# Patient Record
Sex: FEMALE | Race: WHITE | NOT HISPANIC OR LATINO
[De-identification: names, ages, dates, MRNs, and addresses within clinical notes are randomized per-mention and may not be internally consistent; named-entity substitution may affect disease eponyms.]

---
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
## 2020-01-02 ENCOUNTER — APPOINTMENT (OUTPATIENT)
Dept: PEDIATRIC RHEUMATOLOGY | Facility: CLINIC | Age: 20
End: 2020-01-02
Payer: COMMERCIAL

## 2020-01-02 VITALS
SYSTOLIC BLOOD PRESSURE: 120 MMHG | DIASTOLIC BLOOD PRESSURE: 71 MMHG | BODY MASS INDEX: 23.62 KG/M2 | WEIGHT: 164.99 LBS | HEIGHT: 70 IN | HEART RATE: 78 BPM

## 2020-01-02 PROCEDURE — 99215 OFFICE O/P EST HI 40 MIN: CPT

## 2020-01-02 NOTE — CONSULT LETTER
[Courtesy Letter:] : I had the pleasure of seeing your patient, [unfilled], in my office today. [Dear  ___] : Dear  [unfilled], [Please see my note below.] : Please see my note below. [Sincerely,] : Sincerely, [Rachel Fiore MD] : Rachel Fiore MD [[facility]] : [facility]  [FreeTextEntry2] : Ephraim Knight MD\par [address] [place] [address] \par [place] [id number]

## 2020-01-02 NOTE — SOCIAL HISTORY
[Father] : father [Mother] : mother [___ Sisters] : [unfilled] sisters [College] : College [Sexually Active] : patient is sexually active [de-identified] : in [place] (~ 2 hour drive[place] [FreeTextEntry1] : sophomore at [employer], studying hospitality management

## 2020-01-02 NOTE — REVIEW OF SYSTEMS
[NI] : Endocrine [Immunizations are up to date] : Immunizations are up to date [Nl] : Musculoskeletal [Smokers in Home] : no one in home smokes [FreeTextEntry1] : Records maintained by PMD\par received flu vaccine 2018/2019

## 2020-01-02 NOTE — DISCUSSION/SUMMARY
[FreeTextEntry1] : DIAGNOSIS\par \par 1) POLYARTICULAR MAME, RF NEGATIVE\par Tapered Enbrel 25mg from q3w to q4w --> symptomatically worse (aches and stiffness, finger discomfort and warmth in the morning, AM stiffness) \par Enbrel increased to 50mg q3w July 2019 --> symptomatically improved\par On exam, some LOM and stiffness of hands and wrists, mild swelling of multiple MCP's \par Never did x-rays\par Discussed exam abnormalities likely related to past arthritis -- asymptomatic now, inflammatory markers normal \par \par PLAN\par 1. blood tests today to monitor medication toxicity and disease activity \par 2. taper Enbrel to 50mg SQ q4w\par 3. ophtho f/u\par 4. RTC when home in April

## 2020-01-02 NOTE — PHYSICAL EXAM
[Cardiac Auscultation] : normal cardiac auscultation  [Normal] : normal [Auscultation] : lungs clear to auscultation [Refer to Joint Diagram Below] : refer to joint diagram below [Grossly Intact] : grossly intact [_______] : 2nd MCP: [unfilled]  [FreeTextEntry1] : well-appearing [de-identified] : + hands stiff, at rest slightly flexed at MCP's and PIP's, + R MCP's appear slightly swollen compared to L

## 2020-01-02 NOTE — HISTORY OF PRESENT ILLNESS
[___ Month(s) Ago] : [unfilled] month(s) ago [FreeTextEntry1] : Has been on Enbrel q3w, on higher dose since ~ September, now on pen. [facility] dose [facility]/11 at school [facility]. Due yesterday but didn't want to take because no pain anymore, not like before (realized a few months later). No swelling. Not really stiff.\par \par Had hymenectomy.\par \par Semester went well, played tennis. Got flu vaccine at school. \par \par No fevers or illnesses.\par \par Need to schedule ophtho f/u. \par \par Working at [employer] Getting wisdom teeth removed 1/6. Going back to school ~ [employer]. Going to [employer] (study abroad) 2/29 x 9 days. Will be home again in April.

## 2020-01-03 LAB
ALBUMIN SERPL ELPH-MCNC: 4.3 G/DL
ALP BLD-CCNC: 43 U/L
ALT SERPL-CCNC: 11 U/L
ANION GAP SERPL CALC-SCNC: 10 MMOL/L
AST SERPL-CCNC: 16 U/L
BASOPHILS # BLD AUTO: 0.03 K/UL
BASOPHILS NFR BLD AUTO: 0.6 %
BILIRUB SERPL-MCNC: 0.2 MG/DL
BUN SERPL-MCNC: 11 MG/DL
CALCIUM SERPL-MCNC: 8.9 MG/DL
CHLORIDE SERPL-SCNC: 103 MMOL/L
CO2 SERPL-SCNC: 25 MMOL/L
CREAT SERPL-MCNC: 0.84 MG/DL
CRP SERPL-MCNC: 0.56 MG/DL
EOSINOPHIL # BLD AUTO: 0.08 K/UL
EOSINOPHIL NFR BLD AUTO: 1.5 %
ERYTHROCYTE [SEDIMENTATION RATE] IN BLOOD BY WESTERGREN METHOD: 4 MM/HR
GLUCOSE SERPL-MCNC: 99 MG/DL
HCT VFR BLD CALC: 37.2 %
HGB BLD-MCNC: 12.1 G/DL
IMM GRANULOCYTES NFR BLD AUTO: 0.2 %
LYMPHOCYTES # BLD AUTO: 1.68 K/UL
LYMPHOCYTES NFR BLD AUTO: 31.9 %
MAN DIFF?: NORMAL
MCHC RBC-ENTMCNC: 27.9 PG
MCHC RBC-ENTMCNC: 32.5 GM/DL
MCV RBC AUTO: 85.9 FL
MONOCYTES # BLD AUTO: 0.61 K/UL
MONOCYTES NFR BLD AUTO: 11.6 %
NEUTROPHILS # BLD AUTO: 2.86 K/UL
NEUTROPHILS NFR BLD AUTO: 54.2 %
PLATELET # BLD AUTO: 178 K/UL
POTASSIUM SERPL-SCNC: 3.7 MMOL/L
PROT SERPL-MCNC: 6.5 G/DL
RBC # BLD: 4.33 M/UL
RBC # FLD: 13.7 %
SODIUM SERPL-SCNC: 138 MMOL/L
WBC # FLD AUTO: 5.27 K/UL

## 2020-01-04 LAB
M TB IFN-G BLD-IMP: NEGATIVE
QUANTIFERON TB PLUS MITOGEN MINUS NIL: 9.33 IU/ML
QUANTIFERON TB PLUS NIL: 0.02 IU/ML
QUANTIFERON TB PLUS TB1 MINUS NIL: 0 IU/ML
QUANTIFERON TB PLUS TB2 MINUS NIL: 0.01 IU/ML

## 2020-05-14 ENCOUNTER — APPOINTMENT (OUTPATIENT)
Dept: PEDIATRIC RHEUMATOLOGY | Facility: CLINIC | Age: 20
End: 2020-05-14
Payer: COMMERCIAL

## 2020-05-14 PROCEDURE — 99215 OFFICE O/P EST HI 40 MIN: CPT | Mod: 95

## 2020-06-14 NOTE — SOCIAL HISTORY
[Mother] : mother [Father] : father [___ Sisters] : [unfilled] sisters [College] : College [Sexually Active] : patient is sexually active [de-identified] : in [place] (~ 2 hour drive) [FreeTextEntry1] : sophomore at [employer], studying hospitality management

## 2020-06-14 NOTE — CONSULT LETTER
[Dear  ___] : Dear  [unfilled], [Courtesy Letter:] : I had the pleasure of seeing your patient, [unfilled], in my office today. [Please see my note below.] : Please see my note below. [Sincerely,] : Sincerely, [Rachel Fiore MD] : Rachel Fiore MD [The [facility]] : [facility]  [FreeTextEntry2] : DR. DOUGLAS SHOENBERGER \par [address] [place] [address]\[place] [address]

## 2020-06-14 NOTE — REVIEW OF SYSTEMS
[NI] : Endocrine [Nl] : Hematologic/Lymphatic [Immunizations are up to date] : Immunizations are up to date [AM Stiffness] : am stiffness [Wrist Pain] : wrist pain [Finger Pain] : pain in the finger [Smokers in Home] : no one in home smokes [FreeTextEntry1] : Records maintained by PMD\par received flu vaccine 2019/2020 at school

## 2020-06-14 NOTE — PHYSICAL EXAM
[Normal] : normal [Refer to Joint Diagram Below] : refer to joint diagram below [Grossly Intact] : grossly intact [_______] : Wrist: [unfilled]  [FreeTextEntry1] : well-appearing, limited exam - televideo only

## 2020-06-14 NOTE — HISTORY OF PRESENT ILLNESS
[___ Month(s) Ago] : [unfilled] month(s) ago [Other Location: e.g. School (Enter Location, City,State)___] : at [unfilled], at the time of the visit. [Other Location: e.g. Home (Enter Location, City,State)___] : at [unfilled] [Verbal consent obtained from patient] : the patient, [unfilled] [FreeTextEntry1] : On Enbrel q4w (pen is easier). Took 3/9 (delayed due to coronavirus) and ~4/12 (Easter). Plans to take this weekend. Mother doing injections. A few days before due, stiff x 30 min, pain worse in the morning but throughout day - wrists, fingers. No pain during last week. No joint swelling.   \par \par Had bronchitis, fever during trip (end of Feb - beginning of Mar). \par \par Not exercising.\par \par Finished semester. Taking a hospitality class this summer.

## 2020-06-14 NOTE — DISCUSSION/SUMMARY
[FreeTextEntry1] : DIAGNOSIS\par \par 1) POLYARTICULAR MAME, RF NEGATIVE\par Previously only on Enbrel 25mg q3w, q4w (very low dose) \par ~ September 2019 Enbrel increased to 50mg q3w (pen) --> symptomatically improved\par Sometimes reports symptoms a few days before due for dose (mostly wrists, fingers) \par Also hasn't wanted to take when she doesn't have pain  \par On exam, some LOM and stiffness of hands and wrists, mild swelling of multiple MCP's \par -- suspect mostly related to past arthritis (never did x-rays) \par Exam today limited (telehealth visit due to COVID-19 pandemic) but appears to be stable\par \par PLAN\par 1. ordered blood tests to monitor medication toxicity and disease activity \par 2. continue Enbrel 50mg SQ q4w\par 3. ophtho f/u\par 4. follow-up on 8/6 @ 3pm

## 2020-07-19 ENCOUNTER — TRANSCRIPTION ENCOUNTER (OUTPATIENT)
Age: 20
End: 2020-07-19

## 2020-08-06 ENCOUNTER — APPOINTMENT (OUTPATIENT)
Dept: PEDIATRIC RHEUMATOLOGY | Facility: CLINIC | Age: 20
End: 2020-08-06
Payer: COMMERCIAL

## 2020-08-06 PROCEDURE — 99215 OFFICE O/P EST HI 40 MIN: CPT | Mod: 95

## 2020-08-06 NOTE — DISCUSSION/SUMMARY
[FreeTextEntry1] : DIAGNOSIS\par \par 1) POLYARTICULAR MAME, RF NEGATIVE\par Previously only on Enbrel 25mg q3w, q4w (very low dose) \par ~ September 2019 Enbrel increased to 50mg q3w (pen) --> symptomatically improved\par Sometimes reports symptoms a few days before due for dose (mostly wrists, fingers) \par Also hasn't wanted to take when she doesn't have pain  \par On prior exam, some LOM and stiffness of hands and wrists, mild swelling of multiple MCP's \par -- suspect mostly related to past arthritis (never did x-rays) \par \par Exam today limited (telehealth visit today on [employer] due to COVID-19 pandemic) but appears to be stable\par \par PLAN\par 1. ordered blood tests to monitor medication toxicity and disease activity\par 2. continue Enbrel 50mg SQ q4w\par 3. ophtho f/u\par 4. RTC during winter break

## 2020-08-06 NOTE — PHYSICAL EXAM
[Refer to Joint Diagram Below] : refer to joint diagram below [Grossly Intact] : grossly intact [_______] : Wrist: [unfilled]  [Normal] : normal [FreeTextEntry1] : well-appearing, limited exam - televideo only [de-identified] : + difficult making fists (reportedly unchanged)

## 2020-08-06 NOTE — CONSULT LETTER
[Dear  ___] : Dear  [unfilled], [Please see my note below.] : Please see my note below. [Courtesy Letter:] : I had the pleasure of seeing your patient, [unfilled], in my office today. [Sincerely,] : Sincerely, [Rachel Fiore MD] : Rachel Fiore MD [The [facility]] : [facility]  [FreeTextEntry2] : DR. DOUGLAS SHOENBERGER \par [address] [place] [address]\[place] [address]

## 2020-08-06 NOTE — HISTORY OF PRESENT ILLNESS
[___ Month(s) Ago] : [unfilled] month(s) ago [Other Location: e.g. Home (Enter Location, City,State)___] : at [unfilled] [Verbal consent obtained from patient] : the patient, [unfilled] [Other Location: e.g. School (Enter Location, City,State)___] : at [unfilled], at the time of the visit. [FreeTextEntry1] : On Enbrel q4w, last dose ~2 weeks ago (has another 4 doses at home). Sometimes forgets and is a few days late but gets reminded by pain. Mother does injections. Joints are "pretty good." Only gets hand and wrist pain and stiffness x 20 min around when she's due for dose. No swelling. No other joint complaints.     \par \par Labs 5/21/20 unremarkable. COVID-19 Ab test reportedly negative. \par \par Last saw ophtho last summer - will schedule f/u.\par  \par No fevers or new symptoms. \par \par Hasn't exercised much. Took an online class this summer.  \par \par May go to [employer].  \par Going back to school 8/19. Plan for school health center to administer Enbrel. Tennis season cancelled. Will be home for a long winter break starting around Thanksgiving.

## 2020-08-06 NOTE — REVIEW OF SYSTEMS
[NI] : Endocrine [Nl] : Hematologic/Lymphatic [AM Stiffness] : am stiffness [Finger Pain] : pain in the finger [Wrist Pain] : wrist pain [Immunizations are up to date] : Immunizations are up to date [Smokers in Home] : no one in home smokes [FreeTextEntry1] : Records maintained by PMD\par received flu vaccine 2019/2020 at school

## 2020-08-06 NOTE — SOCIAL HISTORY
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

## 2020-12-11 ENCOUNTER — APPOINTMENT (OUTPATIENT)
Dept: PEDIATRIC RHEUMATOLOGY | Facility: CLINIC | Age: 20
End: 2020-12-11
Payer: COMMERCIAL

## 2020-12-11 VITALS
BODY MASS INDEX: 22.91 KG/M2 | DIASTOLIC BLOOD PRESSURE: 69 MMHG | HEIGHT: 70 IN | HEART RATE: 58 BPM | WEIGHT: 160.06 LBS | TEMPERATURE: 97.3 F | SYSTOLIC BLOOD PRESSURE: 119 MMHG

## 2020-12-11 PROCEDURE — 99072 ADDL SUPL MATRL&STAF TM PHE: CPT

## 2020-12-11 PROCEDURE — 99215 OFFICE O/P EST HI 40 MIN: CPT

## 2020-12-12 LAB
ALBUMIN SERPL ELPH-MCNC: 4.4 G/DL
ALP BLD-CCNC: 45 U/L
ALT SERPL-CCNC: 12 U/L
ANION GAP SERPL CALC-SCNC: 9 MMOL/L
AST SERPL-CCNC: 15 U/L
BASOPHILS # BLD AUTO: 0.02 K/UL
BASOPHILS NFR BLD AUTO: 0.4 %
BILIRUB SERPL-MCNC: 0.2 MG/DL
BUN SERPL-MCNC: 11 MG/DL
CALCIUM SERPL-MCNC: 9.1 MG/DL
CHLORIDE SERPL-SCNC: 104 MMOL/L
CO2 SERPL-SCNC: 26 MMOL/L
CREAT SERPL-MCNC: 0.95 MG/DL
CRP SERPL-MCNC: 0.25 MG/DL
EOSINOPHIL # BLD AUTO: 0.04 K/UL
EOSINOPHIL NFR BLD AUTO: 0.8 %
ERYTHROCYTE [SEDIMENTATION RATE] IN BLOOD BY WESTERGREN METHOD: 4 MM/HR
GLUCOSE SERPL-MCNC: 91 MG/DL
HCT VFR BLD CALC: 36.9 %
HGB BLD-MCNC: 12 G/DL
IMM GRANULOCYTES NFR BLD AUTO: 0.2 %
LYMPHOCYTES # BLD AUTO: 1.55 K/UL
LYMPHOCYTES NFR BLD AUTO: 32 %
MAN DIFF?: NORMAL
MCHC RBC-ENTMCNC: 28.4 PG
MCHC RBC-ENTMCNC: 32.5 GM/DL
MCV RBC AUTO: 87.2 FL
MONOCYTES # BLD AUTO: 0.46 K/UL
MONOCYTES NFR BLD AUTO: 9.5 %
NEUTROPHILS # BLD AUTO: 2.77 K/UL
NEUTROPHILS NFR BLD AUTO: 57.1 %
PLATELET # BLD AUTO: 162 K/UL
POTASSIUM SERPL-SCNC: 4 MMOL/L
PROT SERPL-MCNC: 6.5 G/DL
RBC # BLD: 4.23 M/UL
RBC # FLD: 12 %
SODIUM SERPL-SCNC: 139 MMOL/L
WBC # FLD AUTO: 4.85 K/UL

## 2020-12-12 RX ORDER — LEVONORGESTREL AND ETHINYL ESTRADIOL 0.1-0.02MG
KIT ORAL
Refills: 0 | Status: ACTIVE | COMMUNITY

## 2020-12-12 RX ORDER — LEVONORGESTREL AND ETHINYL ESTRADIOL 0.1-0.02MG
KIT ORAL
Refills: 0 | Status: DISCONTINUED | COMMUNITY
End: 2020-12-12

## 2020-12-12 NOTE — REVIEW OF SYSTEMS
[NI] : Endocrine [Nl] : Hematologic/Lymphatic [AM Stiffness] : am stiffness [Wrist Pain] : wrist pain [Immunizations are up to date] : Immunizations are up to date [Hand Pain] : hand pain [Smokers in Home] : no one in home smokes [FreeTextEntry1] : Records maintained by PMD\par received flu vaccine 2019/2020 at school

## 2020-12-12 NOTE — SOCIAL HISTORY
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

## 2020-12-12 NOTE — DISCUSSION/SUMMARY
[FreeTextEntry1] : DIAGNOSIS\par \par 1) POLYARTICULAR MAME, RF NEGATIVE\par Previously only on Enbrel 25mg q3w, q4w (very low dose) \par ~ September 2019 Enbrel increased to 50mg q3w (pen) --> symptomatically improved\par Reports pain and stiffness before due for dose (mostly wrists, fingers) --> feels good for a few weeks after      \par Also hasn't wanted to take Enbrel when she doesn't have pain  \par \par Doing well (the same)\par Exam today appears stable - suspect MCP swelling due to synovial hypertrophy, LOM due to chronic changes  \par \par PLAN\par 1. blood tests today to monitor medication toxicity and disease activity\par 2. continue Enbrel 50mg SQ q4w\par 3. ophtho f/u\par 4. RTC in the spring (consider doing a TEB visit ~ March)

## 2020-12-12 NOTE — CONSULT LETTER
[Dear  ___] : Dear  [unfilled], [Courtesy Letter:] : I had the pleasure of seeing your patient, [unfilled], in my office today. [Please see my note below.] : Please see my note below. [Sincerely,] : Sincerely, [Rachel Fiore MD] : Rachel Fiore MD [The [facility]] : [facility]  [FreeTextEntry2] : DR. DOUGLAS SHOENBERGER \par [address] [place] [address]\[place] [address]

## 2020-12-12 NOTE — PHYSICAL EXAM
[Refer to Joint Diagram Below] : refer to joint diagram below [Grossly Intact] : grossly intact [Cardiac Auscultation] : normal cardiac auscultation  [Auscultation] : lungs clear to auscultation [Normal] : normal [_______] : 5th PIP: [unfilled] [FreeTextEntry1] : well-appearing [de-identified] : + bilateral MCP's swollen and hands appear slightly flexed at MCP's, + reports "pressure" with finger and wrist ROM

## 2020-12-12 NOTE — HISTORY OF PRESENT ILLNESS
[___ Month(s) Ago] : [unfilled] month(s) ago [Other Location: e.g. School (Enter Location, City,State)___] : at [unfilled], at the time of the visit. [Other Location: e.g. Home (Enter Location, City,State)___] : at [unfilled] [Verbal consent obtained from patient] : the patient, [unfilled] [FreeTextEntry1] : On Enbrel q 3-4w, last dose ~mid-November (has 6 doses at home). Roommate (nursing major) did injections at school. Joints are "all right." Still gets the same pain and stiffness before she's due for dose. Mostly hands and wrists, also had knee pain at school (used heating pads). Feels good for a few weeks after Enbrel.     \par \par Labs 8/14 unremarkable.   \par \par No fevers or illnesses.\par  \par Last saw ophtho last summer (2019) - will schedule f/u. \par \par Got flu vaccine at [facility].\par \par Going back to school 1/30. Will be home again when semester ends.

## 2020-12-13 LAB
M TB IFN-G BLD-IMP: NEGATIVE
QUANTIFERON TB PLUS MITOGEN MINUS NIL: 8.3 IU/ML
QUANTIFERON TB PLUS NIL: 0.02 IU/ML
QUANTIFERON TB PLUS TB1 MINUS NIL: 0 IU/ML
QUANTIFERON TB PLUS TB2 MINUS NIL: 0 IU/ML

## 2021-02-09 ENCOUNTER — NON-APPOINTMENT (OUTPATIENT)
Age: 21
End: 2021-02-09

## 2021-07-23 ENCOUNTER — APPOINTMENT (OUTPATIENT)
Dept: PEDIATRIC RHEUMATOLOGY | Facility: CLINIC | Age: 21
End: 2021-07-23
Payer: COMMERCIAL

## 2021-07-23 VITALS
DIASTOLIC BLOOD PRESSURE: 75 MMHG | HEIGHT: 69.69 IN | TEMPERATURE: 97.8 F | WEIGHT: 148 LBS | BODY MASS INDEX: 21.43 KG/M2 | SYSTOLIC BLOOD PRESSURE: 131 MMHG | HEART RATE: 79 BPM

## 2021-07-23 PROCEDURE — 99215 OFFICE O/P EST HI 40 MIN: CPT

## 2021-07-24 LAB
ALBUMIN SERPL ELPH-MCNC: 4.6 G/DL
ALP BLD-CCNC: 46 U/L
ALT SERPL-CCNC: 11 U/L
ANION GAP SERPL CALC-SCNC: 10 MMOL/L
AST SERPL-CCNC: 17 U/L
BASOPHILS # BLD AUTO: 0.03 K/UL
BASOPHILS NFR BLD AUTO: 0.5 %
BILIRUB SERPL-MCNC: 0.2 MG/DL
BUN SERPL-MCNC: 10 MG/DL
CALCIUM SERPL-MCNC: 9.8 MG/DL
CHLORIDE SERPL-SCNC: 103 MMOL/L
CO2 SERPL-SCNC: 25 MMOL/L
CREAT SERPL-MCNC: 0.87 MG/DL
CRP SERPL-MCNC: 4 MG/L
EOSINOPHIL # BLD AUTO: 0.06 K/UL
EOSINOPHIL NFR BLD AUTO: 1.1 %
ERYTHROCYTE [SEDIMENTATION RATE] IN BLOOD BY WESTERGREN METHOD: 12 MM/HR
GLUCOSE SERPL-MCNC: 88 MG/DL
HCT VFR BLD CALC: 39.3 %
HGB BLD-MCNC: 12.7 G/DL
IMM GRANULOCYTES NFR BLD AUTO: 0.2 %
LYMPHOCYTES # BLD AUTO: 1.76 K/UL
LYMPHOCYTES NFR BLD AUTO: 31.2 %
MAN DIFF?: NORMAL
MCHC RBC-ENTMCNC: 28 PG
MCHC RBC-ENTMCNC: 32.3 GM/DL
MCV RBC AUTO: 86.6 FL
MONOCYTES # BLD AUTO: 0.47 K/UL
MONOCYTES NFR BLD AUTO: 8.3 %
NEUTROPHILS # BLD AUTO: 3.31 K/UL
NEUTROPHILS NFR BLD AUTO: 58.7 %
PLATELET # BLD AUTO: 185 K/UL
POTASSIUM SERPL-SCNC: 4.4 MMOL/L
PROT SERPL-MCNC: 7.2 G/DL
RBC # BLD: 4.54 M/UL
RBC # FLD: 13.1 %
SODIUM SERPL-SCNC: 139 MMOL/L
WBC # FLD AUTO: 5.64 K/UL

## 2021-07-25 NOTE — DISCUSSION/SUMMARY
[FreeTextEntry1] : DIAGNOSIS\par \par 1) POLYARTICULAR MAME, RF NEGATIVE\par Previously only on Enbrel 25mg q3w, q4w (very low dose) \par ~ September 2019 Enbrel increased to 50mg q3w (pen) --> symptomatically improved\par Reports pain and stiffness before due for dose (mostly wrists, fingers) --> feels good for a few weeks after      \par Also hasn't wanted to take Enbrel when she doesn't have pain  \par \par Doing well \par Last took Enbrel ~mid-June. Hasn't had feeling that she needs it. Joints worse during the winter. \par Exam today appears stable - no evidence of active arthritis. Suspect MCP swelling due to synovial hypertrophy, LOM due to chronic changes.\par \par PLAN\par 1. blood tests today to monitor medication toxicity and disease activity\par 2. can continue off Enbrel \par 3. RTC in the winter

## 2021-07-25 NOTE — CONSULT LETTER
[Dear  ___] : Dear  [unfilled], [Courtesy Letter:] : I had the pleasure of seeing your patient, [unfilled], in my office today. [Please see my note below.] : Please see my note below. [Sincerely,] : Sincerely, [Rachel Fiore MD] : Rachel Fiore MD [The [facility]] : [facility]  [FreeTextEntry2] : DR. DOUGLAS SHOENBERGER \par [address] [place] [address]\[place] [address]

## 2021-07-25 NOTE — REVIEW OF SYSTEMS
[NI] : Endocrine [Immunizations are up to date] : Immunizations are up to date [Nl] : Musculoskeletal [Smokers in Home] : no one in home smokes [FreeTextEntry1] : Records maintained by PMD\par received flu vaccine [id number] at [facility]

## 2021-07-25 NOTE — SOCIAL HISTORY
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, studying hospitality management

## 2021-07-25 NOTE — HISTORY OF PRESENT ILLNESS
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 Was almost 17 months into wean without flare but developed arthralgias and minimal effusions (R wrist and knees) and re-started full dose Enbrel 25mg weekly 5/15. 7/16 Enbrel every other week and arthritis free since then.    \par \par November 2017 Enbrel 25mg every other week --> q3w\par \par Last saw ophtho (Dr. Tito Quigley) on 7/13/17 -- no inflammation, f/u 1 year\par Saw optometrist ([facility]) 11/21/18 -- exam normal, f/u 12 months \par \par Previously only on Enbrel 25mg q3w, q4w (very low dose) \par ~ September 2019 Enbrel increased to 50mg q3w (pen) --> symptomatically improved\par  \par ------------------------------------------------------------------------------------------------------------------------------- [Harshil] : 2009

## 2021-07-25 NOTE — PHYSICAL EXAM
[Refer to Joint Diagram Below] : refer to joint diagram below [_______] : 5th PIP: [unfilled] [S1, S2 Present] : S1, S2 present [Clear to auscultation] : clear to auscultation [Soft] : soft [NonTender] : non tender [Cranial nerves grossly intact] : cranial nerves grossly intact [Rash] : no rash [Erythematous Conjunctiva] : nonerythematous conjunctiva [Ulcers] : no ulcers [FreeTextEntry1] : well-appearing [FreeTextEntry2] : EOMI [de-identified] : + bilateral MCP's swollen and hands appear slightly flexed at MCP's, + fingers and wrists feel stiff

## 2022-03-11 ENCOUNTER — APPOINTMENT (OUTPATIENT)
Dept: PEDIATRIC RHEUMATOLOGY | Facility: CLINIC | Age: 22
End: 2022-03-11
Payer: COMMERCIAL

## 2022-03-11 VITALS
DIASTOLIC BLOOD PRESSURE: 79 MMHG | SYSTOLIC BLOOD PRESSURE: 116 MMHG | WEIGHT: 152.12 LBS | HEART RATE: 69 BPM | BODY MASS INDEX: 22.02 KG/M2 | HEIGHT: 69.76 IN

## 2022-03-11 DIAGNOSIS — M08.3 JUVENILE RHEUMATOID POLYARTHRITIS (SERONEGATIVE): ICD-10-CM

## 2022-03-11 DIAGNOSIS — Z79.899 OTHER LONG TERM (CURRENT) DRUG THERAPY: ICD-10-CM

## 2022-03-11 DIAGNOSIS — Z51.81 ENCOUNTER FOR THERAPEUTIC DRUG LVL MONITORING: ICD-10-CM

## 2022-03-11 LAB
ALBUMIN SERPL ELPH-MCNC: 4.5 G/DL
ALP BLD-CCNC: 48 U/L
ALT SERPL-CCNC: 8 U/L
ANION GAP SERPL CALC-SCNC: 13 MMOL/L
AST SERPL-CCNC: 14 U/L
BASOPHILS # BLD AUTO: 0.01 K/UL
BASOPHILS NFR BLD AUTO: 0.2 %
BILIRUB SERPL-MCNC: 0.2 MG/DL
BUN SERPL-MCNC: 13 MG/DL
CALCIUM SERPL-MCNC: 9.4 MG/DL
CHLORIDE SERPL-SCNC: 105 MMOL/L
CO2 SERPL-SCNC: 23 MMOL/L
CREAT SERPL-MCNC: 0.93 MG/DL
CRP SERPL-MCNC: <3 MG/L
EGFR: 90 ML/MIN/1.73M2
EOSINOPHIL # BLD AUTO: 0.04 K/UL
EOSINOPHIL NFR BLD AUTO: 0.8 %
ERYTHROCYTE [SEDIMENTATION RATE] IN BLOOD BY WESTERGREN METHOD: 3 MM/HR
GLUCOSE SERPL-MCNC: 76 MG/DL
HCT VFR BLD CALC: 40.5 %
HGB BLD-MCNC: 13.3 G/DL
IMM GRANULOCYTES NFR BLD AUTO: 0 %
LYMPHOCYTES # BLD AUTO: 1.84 K/UL
LYMPHOCYTES NFR BLD AUTO: 37.7 %
MAN DIFF?: NORMAL
MCHC RBC-ENTMCNC: 28.9 PG
MCHC RBC-ENTMCNC: 32.8 GM/DL
MCV RBC AUTO: 88 FL
MONOCYTES # BLD AUTO: 0.47 K/UL
MONOCYTES NFR BLD AUTO: 9.6 %
NEUTROPHILS # BLD AUTO: 2.52 K/UL
NEUTROPHILS NFR BLD AUTO: 51.7 %
PLATELET # BLD AUTO: 199 K/UL
POTASSIUM SERPL-SCNC: 4.4 MMOL/L
PROT SERPL-MCNC: 7 G/DL
RBC # BLD: 4.6 M/UL
RBC # FLD: 12.6 %
SODIUM SERPL-SCNC: 141 MMOL/L
WBC # FLD AUTO: 4.88 K/UL

## 2022-03-11 PROCEDURE — 99215 OFFICE O/P EST HI 40 MIN: CPT

## 2022-03-11 NOTE — SOCIAL HISTORY
[Mother] : mother [Father] : father [___ Sisters] : [unfilled] sisters [College] : College [Sexually Active] : patient is sexually active [de-identified] : in [place] [FreeTextEntry1] : senior at [employer], studying hospitality management

## 2022-03-11 NOTE — PHYSICAL EXAM
[S1, S2 Present] : S1, S2 present [Clear to auscultation] : clear to auscultation [Soft] : soft [NonTender] : non tender [Refer to Joint Diagram Below] : refer to joint diagram below [Cranial nerves grossly intact] : cranial nerves grossly intact [_______] : 5th PIP: [unfilled] [Rash] : no rash [Erythematous Conjunctiva] : nonerythematous conjunctiva [Ulcers] : no ulcers [FreeTextEntry1] : well-appearing [FreeTextEntry2] : EOMI [de-identified] : + bilateral MCP's slightly swollen and hands appear slightly flexed at MCP's, + fingers and wrists feel stiff, + L hand fingers feel "uncomfortable" with ROM, + slight ulnar deviation

## 2022-03-11 NOTE — CONSULT LETTER
[Dear  ___] : Dear  [unfilled], [Courtesy Letter:] : I had the pleasure of seeing your patient, [unfilled], in my office today. [Please see my note below.] : Please see my note below. [Sincerely,] : Sincerely, [Rachel Fiore MD] : Rachel Fiore MD [[facility]] : [facility]  [FreeTextEntry2] : DR. DOUGLAS SHOENBERGER \par [address] [place] [address]\[place] [address]

## 2022-03-11 NOTE — REVIEW OF SYSTEMS
[NI] : Endocrine [Nl] : Hematologic/Lymphatic [Immunizations are up to date] : Immunizations are up to date [Joint Pains] : arthralgias [Smokers in Home] : no one in home smokes [FreeTextEntry1] : Records maintained by PMD\par received flu vaccine [id number] at [facility]

## 2022-03-11 NOTE — HISTORY OF PRESENT ILLNESS
[Polyarticular RF Negative] : Polyarticular RF Negative [MAVIS negative] : MAVIS negative [RF negative] : RF negative [No] : no iritis [FreeTextEntry1] : Most Recent Visit: ~8 months ago\par \par Last took Enbrel 3/1. Started spring tennis and had joint pain, was waking up with aches in wrists, hands, and knees. Resolved after taking Enbrel. Prior dose was 2-3 months before. Has 3 Enbrel doses at home. Can do everything in terms of activity, tennis. Playing tennis 5 days/wk x 1.5 hours + will be starting weekends.\par \par Got COVID booster in January. Also got flu vaccine.\par \par Saw ophtho in January - eyesight worse but reportedly no inflammation.\par \par Will be graduating in May.\par Over the summer, planning to get a job and go to [employer].\par Will be working at [employer] x 5 months (Aug-Jan). \par  [Harshil] : 2009

## 2022-03-11 NOTE — DISCUSSION/SUMMARY
[FreeTextEntry1] : DIAGNOSIS\par \par 1) POLYARTICULAR MAME, RF NEGATIVE\par Previously only on Enbrel 25mg q3w, q4w (very low dose) \par ~ September 2019 Enbrel increased to 50mg q3w (pen) --> symptomatically improved\par Reports pain and stiffness before due for dose (mostly wrists, fingers) --> feels good for a few weeks after      \par Also hasn't wanted to take Enbrel when she doesn't have pain  \par \par Doing well \par Taking Enbrel very intermittently \par Exam today appears stable - no evidence of active arthritis. Suspect MCP swelling due to synovial hypertrophy, LOM due to chronic changes.\par \par PLAN\par 1. blood tests today to monitor medication toxicity and disease activity \par 2. currently taking Enbrel every few months PRN \par 3. RTC August (discussed that this will be last appt and then she should transition to adult rheumatology)

## 2022-03-15 LAB
M TB IFN-G BLD-IMP: NEGATIVE
QUANTIFERON TB PLUS MITOGEN MINUS NIL: 9.98 IU/ML
QUANTIFERON TB PLUS NIL: 0.02 IU/ML
QUANTIFERON TB PLUS TB1 MINUS NIL: -0.01 IU/ML
QUANTIFERON TB PLUS TB2 MINUS NIL: -0.01 IU/ML

## 2022-07-19 ENCOUNTER — NON-APPOINTMENT (OUTPATIENT)
Age: 22
End: 2022-07-19

## 2022-07-21 ENCOUNTER — APPOINTMENT (OUTPATIENT)
Dept: PEDIATRIC RHEUMATOLOGY | Facility: CLINIC | Age: 22
End: 2022-07-21

## 2022-07-25 RX ORDER — ETANERCEPT 50 MG/ML
50 SOLUTION SUBCUTANEOUS
Qty: 1 | Refills: 0 | Status: ACTIVE | COMMUNITY
Start: 2019-10-02 | End: 1900-01-01

## 2022-07-28 ENCOUNTER — NON-APPOINTMENT (OUTPATIENT)
Age: 22
End: 2022-07-28

## 2022-07-29 ENCOUNTER — NON-APPOINTMENT (OUTPATIENT)
Age: 22
End: 2022-07-29

## 2023-09-01 NOTE — REASON FOR VISIT
Patient/Caregiver provided printed discharge information. [Follow-Up: _____] : a [unfilled] follow-up visit [Patient] : patient